# Patient Record
Sex: MALE | Race: WHITE | NOT HISPANIC OR LATINO
[De-identification: names, ages, dates, MRNs, and addresses within clinical notes are randomized per-mention and may not be internally consistent; named-entity substitution may affect disease eponyms.]

---

## 2018-02-05 PROBLEM — Z00.00 ENCOUNTER FOR PREVENTIVE HEALTH EXAMINATION: Status: ACTIVE | Noted: 2018-02-05

## 2018-02-27 ENCOUNTER — APPOINTMENT (OUTPATIENT)
Dept: CARDIOLOGY | Facility: CLINIC | Age: 48
End: 2018-02-27
Payer: COMMERCIAL

## 2018-02-27 ENCOUNTER — NON-APPOINTMENT (OUTPATIENT)
Age: 48
End: 2018-02-27

## 2018-02-27 VITALS
SYSTOLIC BLOOD PRESSURE: 135 MMHG | WEIGHT: 165 LBS | HEART RATE: 66 BPM | BODY MASS INDEX: 25.9 KG/M2 | HEIGHT: 67 IN | OXYGEN SATURATION: 98 % | DIASTOLIC BLOOD PRESSURE: 81 MMHG

## 2018-02-27 DIAGNOSIS — Z86.79 PERSONAL HISTORY OF OTHER DISEASES OF THE CIRCULATORY SYSTEM: ICD-10-CM

## 2018-02-27 DIAGNOSIS — E78.89 OTHER LIPOPROTEIN METABOLISM DISORDERS: ICD-10-CM

## 2018-02-27 DIAGNOSIS — Z78.9 OTHER SPECIFIED HEALTH STATUS: ICD-10-CM

## 2018-02-27 DIAGNOSIS — Z82.49 FAMILY HISTORY OF ISCHEMIC HEART DISEASE AND OTHER DISEASES OF THE CIRCULATORY SYSTEM: ICD-10-CM

## 2018-02-27 PROCEDURE — 93000 ELECTROCARDIOGRAM COMPLETE: CPT

## 2018-02-27 PROCEDURE — 99245 OFF/OP CONSLTJ NEW/EST HI 55: CPT

## 2018-02-27 PROCEDURE — 99401 PREV MED CNSL INDIV APPRX 15: CPT

## 2018-03-18 PROBLEM — Z86.79 HISTORY OF ASCVD: Status: RESOLVED | Noted: 2018-03-18 | Resolved: 2018-03-18

## 2018-03-18 PROBLEM — Z78.9 DOES NOT USE ILLICIT DRUGS: Status: ACTIVE | Noted: 2018-03-18

## 2018-03-18 PROBLEM — Z78.9 CURRENT NON-SMOKER: Status: ACTIVE | Noted: 2018-03-18

## 2018-03-18 PROBLEM — E78.89 ELEVATED LP(A): Status: RESOLVED | Noted: 2018-03-18 | Resolved: 2018-03-18

## 2018-03-18 PROBLEM — Z82.49 FAMILY HISTORY OF ARTERIOSCLEROTIC CARDIOVASCULAR DISEASE: Status: ACTIVE | Noted: 2018-03-18

## 2018-05-11 ENCOUNTER — APPOINTMENT (OUTPATIENT)
Dept: CV DIAGNOSTICS | Facility: HOSPITAL | Age: 48
End: 2018-05-11

## 2018-05-11 ENCOUNTER — RESULT CHARGE (OUTPATIENT)
Age: 48
End: 2018-05-11

## 2018-05-11 ENCOUNTER — OUTPATIENT (OUTPATIENT)
Dept: OUTPATIENT SERVICES | Facility: HOSPITAL | Age: 48
LOS: 1 days | End: 2018-05-11
Payer: COMMERCIAL

## 2018-05-11 DIAGNOSIS — E78.5 HYPERLIPIDEMIA, UNSPECIFIED: ICD-10-CM

## 2018-05-11 PROCEDURE — 93018 CV STRESS TEST I&R ONLY: CPT

## 2018-05-11 PROCEDURE — 93016 CV STRESS TEST SUPVJ ONLY: CPT

## 2018-05-12 ENCOUNTER — INPATIENT (INPATIENT)
Facility: HOSPITAL | Age: 48
LOS: 0 days | Discharge: HOME | DRG: 247 | End: 2018-05-13
Attending: HOSPITALIST | Admitting: HOSPITALIST
Payer: COMMERCIAL

## 2018-05-12 VITALS
OXYGEN SATURATION: 100 % | HEIGHT: 67 IN | RESPIRATION RATE: 20 BRPM | TEMPERATURE: 98 F | HEART RATE: 65 BPM | DIASTOLIC BLOOD PRESSURE: 80 MMHG | WEIGHT: 160.06 LBS | SYSTOLIC BLOOD PRESSURE: 158 MMHG

## 2018-05-12 DIAGNOSIS — I25.10 ATHEROSCLEROTIC HEART DISEASE OF NATIVE CORONARY ARTERY WITHOUT ANGINA PECTORIS: ICD-10-CM

## 2018-05-12 DIAGNOSIS — E78.89 OTHER LIPOPROTEIN METABOLISM DISORDERS: ICD-10-CM

## 2018-05-12 DIAGNOSIS — R07.89 OTHER CHEST PAIN: ICD-10-CM

## 2018-05-12 DIAGNOSIS — M10.9 GOUT, UNSPECIFIED: ICD-10-CM

## 2018-05-12 DIAGNOSIS — Z29.9 ENCOUNTER FOR PROPHYLACTIC MEASURES, UNSPECIFIED: ICD-10-CM

## 2018-05-12 LAB
ALBUMIN SERPL ELPH-MCNC: 4.5 G/DL — SIGNIFICANT CHANGE UP (ref 3.3–5)
ALP SERPL-CCNC: 65 U/L — SIGNIFICANT CHANGE UP (ref 40–120)
ALT FLD-CCNC: 20 U/L — SIGNIFICANT CHANGE UP (ref 10–45)
ANION GAP SERPL CALC-SCNC: 10 MMOL/L — SIGNIFICANT CHANGE UP (ref 5–17)
AST SERPL-CCNC: 17 U/L — SIGNIFICANT CHANGE UP (ref 10–40)
BILIRUB SERPL-MCNC: 0.4 MG/DL — SIGNIFICANT CHANGE UP (ref 0.2–1.2)
BUN SERPL-MCNC: 20 MG/DL — SIGNIFICANT CHANGE UP (ref 7–23)
CALCIUM SERPL-MCNC: 9.5 MG/DL — SIGNIFICANT CHANGE UP (ref 8.4–10.5)
CHLORIDE SERPL-SCNC: 104 MMOL/L — SIGNIFICANT CHANGE UP (ref 96–108)
CO2 SERPL-SCNC: 27 MMOL/L — SIGNIFICANT CHANGE UP (ref 22–31)
CREAT SERPL-MCNC: 0.95 MG/DL — SIGNIFICANT CHANGE UP (ref 0.5–1.3)
GLUCOSE SERPL-MCNC: 102 MG/DL — HIGH (ref 70–99)
HCT VFR BLD CALC: 44.4 % — SIGNIFICANT CHANGE UP (ref 39–50)
HGB BLD-MCNC: 15 G/DL — SIGNIFICANT CHANGE UP (ref 13–17)
MCHC RBC-ENTMCNC: 31.9 PG — SIGNIFICANT CHANGE UP (ref 27–34)
MCHC RBC-ENTMCNC: 33.8 GM/DL — SIGNIFICANT CHANGE UP (ref 32–36)
MCV RBC AUTO: 94.5 FL — SIGNIFICANT CHANGE UP (ref 80–100)
PLATELET # BLD AUTO: 231 K/UL — SIGNIFICANT CHANGE UP (ref 150–400)
POTASSIUM SERPL-MCNC: 4.5 MMOL/L — SIGNIFICANT CHANGE UP (ref 3.5–5.3)
POTASSIUM SERPL-SCNC: 4.5 MMOL/L — SIGNIFICANT CHANGE UP (ref 3.5–5.3)
PROT SERPL-MCNC: 7.4 G/DL — SIGNIFICANT CHANGE UP (ref 6–8.3)
RBC # BLD: 4.7 M/UL — SIGNIFICANT CHANGE UP (ref 4.2–5.8)
RBC # FLD: 11.9 % — SIGNIFICANT CHANGE UP (ref 10.3–14.5)
SODIUM SERPL-SCNC: 141 MMOL/L — SIGNIFICANT CHANGE UP (ref 135–145)
WBC # BLD: 7 K/UL — SIGNIFICANT CHANGE UP (ref 3.8–10.5)
WBC # FLD AUTO: 7 K/UL — SIGNIFICANT CHANGE UP (ref 3.8–10.5)

## 2018-05-12 PROCEDURE — 93010 ELECTROCARDIOGRAM REPORT: CPT

## 2018-05-12 PROCEDURE — 99255 IP/OBS CONSLTJ NEW/EST HI 80: CPT

## 2018-05-12 PROCEDURE — 93017 CV STRESS TEST TRACING ONLY: CPT

## 2018-05-12 RX ORDER — LORATADINE 10 MG/1
10 TABLET ORAL DAILY
Qty: 0 | Refills: 0 | Status: DISCONTINUED | OUTPATIENT
Start: 2018-05-12 | End: 2018-05-13

## 2018-05-12 RX ORDER — CLOPIDOGREL BISULFATE 75 MG/1
75 TABLET, FILM COATED ORAL DAILY
Qty: 0 | Refills: 0 | Status: DISCONTINUED | OUTPATIENT
Start: 2018-05-13 | End: 2018-05-13

## 2018-05-12 RX ORDER — ACETAMINOPHEN 500 MG
325 TABLET ORAL EVERY 6 HOURS
Qty: 0 | Refills: 0 | Status: DISCONTINUED | OUTPATIENT
Start: 2018-05-12 | End: 2018-05-12

## 2018-05-12 RX ORDER — ROSUVASTATIN CALCIUM 5 MG/1
40 TABLET ORAL AT BEDTIME
Qty: 0 | Refills: 0 | Status: DISCONTINUED | OUTPATIENT
Start: 2018-05-12 | End: 2018-05-13

## 2018-05-12 RX ORDER — ALLOPURINOL 300 MG
1 TABLET ORAL
Qty: 0 | Refills: 0 | COMMUNITY

## 2018-05-12 RX ORDER — ATORVASTATIN CALCIUM 80 MG/1
40 TABLET, FILM COATED ORAL AT BEDTIME
Qty: 0 | Refills: 0 | Status: DISCONTINUED | OUTPATIENT
Start: 2018-05-12 | End: 2018-05-12

## 2018-05-12 RX ORDER — ACETAMINOPHEN 500 MG
650 TABLET ORAL EVERY 6 HOURS
Qty: 0 | Refills: 0 | Status: DISCONTINUED | OUTPATIENT
Start: 2018-05-12 | End: 2018-05-13

## 2018-05-12 RX ORDER — ALLOPURINOL 300 MG
300 TABLET ORAL DAILY
Qty: 0 | Refills: 0 | Status: DISCONTINUED | OUTPATIENT
Start: 2018-05-12 | End: 2018-05-13

## 2018-05-12 RX ORDER — ENOXAPARIN SODIUM 100 MG/ML
40 INJECTION SUBCUTANEOUS EVERY 24 HOURS
Qty: 0 | Refills: 0 | Status: DISCONTINUED | OUTPATIENT
Start: 2018-05-13 | End: 2018-05-13

## 2018-05-12 RX ORDER — CETIRIZINE HYDROCHLORIDE 10 MG/1
1 TABLET ORAL
Qty: 0 | Refills: 0 | COMMUNITY

## 2018-05-12 RX ORDER — ASPIRIN/CALCIUM CARB/MAGNESIUM 324 MG
81 TABLET ORAL DAILY
Qty: 0 | Refills: 0 | Status: DISCONTINUED | OUTPATIENT
Start: 2018-05-12 | End: 2018-05-13

## 2018-05-12 RX ADMIN — ROSUVASTATIN CALCIUM 40 MILLIGRAM(S): 5 TABLET ORAL at 22:12

## 2018-05-12 RX ADMIN — Medication 300 MILLIGRAM(S): at 21:48

## 2018-05-12 RX ADMIN — LORATADINE 10 MILLIGRAM(S): 10 TABLET ORAL at 21:48

## 2018-05-12 NOTE — H&P ADULT - NSHPLABSRESULTS_GEN_ALL_CORE
The Labs were reviewed by me   The Radiology was reviewed by me    EKG tracing reviewed by me    05-12    141  |  104  |  20  ----------------------------<  102<H>  4.5   |  27  |  0.95    Ca    9.5      12 May 2018 09:24    TPro  7.4  /  Alb  4.5  /  TBili  0.4  /  DBili  x   /  AST  17  /  ALT  20  /  AlkPhos  65  05-1                                        15.0   7.0   )-----------( 231      ( 12 May 2018 09:24 )             44.4       < from: Cardiac Treadmill Stress Test (Non Imaging) (05.11.18 @ 16:11) >    ECG ABNORMALITIES DURING/AFTER STRESS:   ST Changes:ST Depression: 2 mm upsloping in leads V3, V4,  V5, V6 started at 06:00 min of exercise at HR of 125 and  persisted 07:00 min into recovery.    < end of copied text >    < from: Cardiac Cath Lab - Adult (05.12.18 @ 09:41) >     A successful drug-eluting stent was performed on the  90 % lesion in the distal circumflex.    < end of copied text >    EKG: Normal sinus rhythm

## 2018-05-12 NOTE — H&P ADULT - FAMILY HISTORY
Father  Still living? Unknown  Family history of hyperlipidemia, Age at diagnosis: Age Unknown     Sibling  Still living? Unknown  Family history of hyperlipidemia, Age at diagnosis: Age Unknown

## 2018-05-12 NOTE — H&P ADULT - PROBLEM SELECTOR PLAN 1
- Pt had an abnormal stress test as outpatient  - Cardiac cath showed 90% stenosis of the left circumflex distal artery, s/p RAMON placement   - Pt completed ASA and plavix load   - c/w ASA 81mg and Plavix 75mg q daily   - c/w High intensity statin, Atorvastatin 80mg once daily   - f/u Cardiology recs  - monitor on telemetry   - EKG in the AM - Pt had an abnormal stress test as outpatient  - Cardiac cath showed 90% stenosis of the left circumflex distal artery, s/p RAMON placement   - Pt completed ASA and plavix load   - c/w ASA 81mg and Plavix 75mg q daily   - c/w High intensity statin, Rosuvastatin 40mg once daily   - f/u Cardiology recs  - monitor on telemetry   - EKG in the AM

## 2018-05-12 NOTE — H&P ADULT - ASSESSMENT
47 y.o. male with HLD, hyperlipoproteinemia A, gout admitted for elective cardiac catheterization with RAMON placed in the distal left circumflex artery

## 2018-05-12 NOTE — H&P ADULT - NSHPPHYSICALEXAM_GEN_ALL_CORE
Vital Signs Last 24 Hrs  T(C): 36.4 (12 May 2018 09:21), Max: 36.4 (12 May 2018 09:21)  T(F): 97.5 (12 May 2018 09:21), Max: 97.5 (12 May 2018 09:21)  HR: 65 (12 May 2018 09:21) (65 - 65)  BP: 158/80 (12 May 2018 09:21) (158/80 - 158/80)  BP(mean): 106 (12 May 2018 09:21) (106 - 106)  RR: 20 (12 May 2018 09:21) (20 - 20)  SpO2: 100% (12 May 2018 09:21) (100% - 100%)    PHYSICAL EXAM:  GENERAL: NAD, well-groomed, well-developed  EYES: EOMI, PERRLA, conjunctiva and sclera clear  ENMT: No tonsillar erythema, exudates, or enlargement; Moist mucous membranes  NECK: Supple, No JVD, Normal thyroid  CHEST/LUNG: Clear to percussion bilaterally; No rales, rhonchi, wheezing, or rubs  HEART: Regular rate and rhythm; No murmurs, rubs, or gallops  ABDOMEN: Soft, Nontender, Nondistended; Bowel sounds present  VASCULAR:  2+ Peripheral Pulses, No clubbing, cyanosis, or edema  SKIN: No rashes or lesions  NERVOUS SYSTEM:  Alert & Oriented X3, Good concentration Vital Signs Last 24 Hrs  T(C): 36.4 (12 May 2018 09:21), Max: 36.4 (12 May 2018 09:21)  T(F): 97.5 (12 May 2018 09:21), Max: 97.5 (12 May 2018 09:21)  HR: 65 (12 May 2018 09:21) (65 - 65)  BP: 158/80 (12 May 2018 09:21) (158/80 - 158/80)  BP(mean): 106 (12 May 2018 09:21) (106 - 106)  RR: 20 (12 May 2018 09:21) (20 - 20)  SpO2: 100% (12 May 2018 09:21) (100% - 100%)    PHYSICAL EXAM:  GENERAL: NAD, well-groomed, well-developed  EYES: EOMI, PERRLA, conjunctiva and sclera clear  ENMT: No tonsillar erythema, exudates, or enlargement; Moist mucous membranes  NECK: Supple, No JVD, Normal thyroid  CHEST/LUNG: Clear to percussion bilaterally; No rales, rhonchi, wheezing, or rubs  HEART: Regular rate and rhythm; No murmurs, rubs, or gallops  ABDOMEN: Soft, Nontender, Nondistended; Bowel sounds present  VASCULAR:  2+ Peripheral Pulses, No clubbing, cyanosis, or edema. R femoral site without bruit or bruise. R radial site clean.  SKIN: No rashes or lesions  NERVOUS SYSTEM:  Alert & Oriented X3, Good concentration

## 2018-05-12 NOTE — CHART NOTE - NSCHARTNOTEFT_GEN_A_CORE
47M with HLD with hyperlipoproteinemia A, gout and allergies admitted after an abnormal stress test for an elective catheterization.  Patient is a frequent jogger who was asymptomatic until recently, whereupon he developed mild discomfort.  He went for an exercise stress test 5/11/18, which was positive for ST Depression: 2 mm upsloping in leads V3, V4, V5, V6 started at 06:00 min of exercise at HR of 125 and persisted 07:00 min into recovery.  As such, patient underwent LHC today (5/12) with RAMON to LCx (90%).  Patient currently asymptomatic without complaints and is eager to go home tomorrow.  Endorses mild pain/discomfort when RFA site palpated.  Area is C/D/I as is the RRA that was the initial site of cath attempt.    Cardiology, Dr Noel, evaluated patient, and recommended patient be monitored on Tele overnight and maybe d/nicole home tomorrow.    Plan:  - monitor on Tele  - c/w ASA/Plavix with OMT  - f/u Cards recs and likely d/c home tmw    MD Zaheer PGY-3  Internal Medicine Team 4  [phone]

## 2018-05-12 NOTE — H&P ADULT - ATTENDING COMMENTS
Agree.  Patietn well appearing s/p RAMON on DAPT; being treated outpatient for genetic hyperlipidemia.   Femoral site and radial site clean and well appearing, d/c home tmrw likely.   D/w Dr. Noel.

## 2018-05-12 NOTE — H&P ADULT - NSHPREVIEWOFSYSTEMS_GEN_ALL_CORE
REVIEW OF SYSTEMS:  CONSTITUTIONAL: No fever, chills, night sweats, or fatigue  EYES: No eye pain, visual disturbances, or discharge  ENMT:  No difficulty hearing, tinnitus, vertigo; No sinus or throat pain  NECK: No pain or stiffness  BREASTS: No pain, masses, or nipple discharge  RESPIRATORY: No cough, wheezing, or hemoptysis; No shortness of breath  CARDIOVASCULAR: No chest pain, palpitations, dizziness, or leg swelling  GASTROINTESTINAL: No abdominal or epigastric pain. No nausea, vomiting, or hematemesis; No diarrhea or constipation. No melena or hematochezia.  GENITOURINARY: No dysuria, frequency, hematuria, or incontinence  NEUROLOGICAL: No headaches, memory loss, loss of strength, numbness, or tremors  SKIN: No itching, burning, rashes, or lesions   LYMPH NODES: No enlarged glands  ENDOCRINE: No heat or cold intolerance; No hair loss  MUSCULOSKELETAL: No joint pain or swelling; No muscle, back, or extremity pain  PSYCHIATRIC: No depression, anxiety, mood swings, or difficulty sleeping  HEME/LYMPH: No easy bruising, or bleeding gums  ALLERY AND IMMUNOLOGIC: No hives or eczema

## 2018-05-12 NOTE — CONSULT NOTE ADULT - SUBJECTIVE AND OBJECTIVE BOX
**********              CARDIOLOGY CONSULT NOTE              ************  ============================================================  CHIEF COMPLAINT/REASON FOR CONSULT:  Patient is a 47y old  Male who presents with a chief complaint of Elective cardiac cath (12 May 2018 12:05)      HISTORY OF PRESENT ILLNESS:  47yMale with a history of HTN, HL, Sig FHx, ASCVD - p/w CP abnormal stress found to have tight LCx now s/p PCI.  CP /10 occurring while running. Occurred on resting. Progressive. No asssoc SOB/Palps.   Radiates down clavicle.   Reviewed outside medical records. Obtained history from relevant family members.    ============================================================    ============================================================        Allergies    bees (Hives)  Cipro (Hives)    Intolerances    	    MEDICATIONS:  aspirin enteric coated 81 milliGRAM(s) Oral daily      loratadine 10 milliGRAM(s) Oral daily    acetaminophen   Tablet 650 milliGRAM(s) Oral every 6 hours PRN  acetaminophen   Tablet. 325 milliGRAM(s) Oral every 6 hours PRN      allopurinol 300 milliGRAM(s) Oral daily  atorvastatin 40 milliGRAM(s) Oral at bedtime        PAST MEDICAL & SURGICAL HISTORY:  ASCVD (arteriosclerotic cardiovascular disease)  Elevated lipoprotein(a)  HLD (hyperlipidemia)  No significant past surgical history      FAMILY HISTORY:  Family history of hyperlipidemia (Father, Sibling)    NC - Mother/Father    SOCIAL HISTORY:    [ x] Non-smoker  [x] No Illicit Drug Use  [ x] No Excess EtOH Use      REVIEW OF SYSTEMS: (Unless + Before Symptom, it is negative)  Constitutional: No Fever, Fatigue, Weight Changes  Eyes: No Recent Vision Changes, Eye Pain  ENT: No Congestion, Sore Throat  Endocrine: No Excess Sweating, Temperature Intolerance  Cardiovascular: +Chest Pain, Palpitations, Shortness of Breath, Pre-syncope, Syncope, LE Edema  Respiratory: No Cough, Congestion, Wheezing  Gastrointestinal: No Abdominal Pain, Nausea, Vomiting  Genitourinary: No dysuria, hematuria  Musculoskeletal: No Joint Pain, Swelling  Neurologic: No headaches, Imbalance, Weakness  Skin: No rashes, hematoma, purprura    ================================    PHYSICAL EXAM:  T(C): 36.4 (18 @ 09:21), Max: 36.4 (18 @ 09:21)  HR: 65 (18 @ 09:21) (65 - 65)  BP: 158/80 (18 @ 09:21) (158/80 - 158/80)  RR: 20 (18 @ 09:21) (20 - 20)  SpO2: 100% (18 @ 09:21) (100% - 100%)  Wt(kg): --  I&O's Summary    Appearance: Normal; NAD	  HEENT:   Normal oral mucosa, EOMI	  Lymphatic: No lymphadenopathy  Cardiovascular: Normal S1 S2, No JVD, No murmurs, No edema  Respiratory: Lungs clear to auscultation, no use of accessory muscles	  Psychiatry: A & O x 3, Mood & affect appropriate  Gastrointestinal:  Soft, Non-tender	  Skin: No rashes, No ecchymoses, No cyanosis	  Neurologic: Non-focal, No Focal Deficits  Extremities: Normal range of motion, No clubbing, cyanosis or edema  Vascular: Peripheral pulses palpable 2+ bilaterally, no prominent varicosities    ============================    LABS:	   Labs [id number] @ 12:33	    CBC Full  -  ( 12 May 2018 09:24 )  WBC Count : 7.0 K/uL  Hemoglobin : 15.0 g/dL  Hematocrit : 44.4 %  Platelet Count - Automated : 231 K/uL  Mean Cell Volume : 94.5 fl  Mean Cell Hemoglobin : 31.9 pg  Mean Cell Hemoglobin Concentration : 33.8 gm/dL  Auto Neutrophil # : x  Auto Lymphocyte # : x  Auto Monocyte # : x  Auto Eosinophil # : x  Auto Basophil # : x  Auto Neutrophil % : x  Auto Lymphocyte % : x  Auto Monocyte % : x  Auto Eosinophil % : x  Auto Basophil % : x        141  |  104  |  20  ----------------------------<  102<H>  4.5   |  27  |  0.95    Ca    9.5      12 May 2018 09:24    TPro  7.4  /  Alb  4.5  /  TBili  0.4  /  DBili  x   /  AST  17  /  ALT  20  /  AlkPhos  65          =========================================================================  ECG:  	  NSR no ischemic changes    PREVIOUS DIAGNOSTIC TESTING:    [X] Echocardiogram:      [X]  Catheterization:  < from: Cardiac Cath Lab - Adult (18 @ 09:41) >  Case Physician(s):  LATIA Gonzalez M.D.  Referring Physician:  Leroy Wick M.D.  INDICATIONS: Stable angina - CCS2. Abnormal stress test.  HISTORY: There was no prior cardiac history. The patient has hypertension  and medication-treated dyslipidemia.  PROCEDURE:  --  Left coronary angiography.  --  Right coronary angiography.  --  Hemostasis with Angioseal.  --  Sheath Exchange for Intervention.  --  Intervention on distal circumflex: drug-eluting stent.  TECHNIQUE: The risks and alternatives of the procedures and conscious  sedation were explained to the patient and informed consent was obtained.  Cardiac catheterization performed electively. Coronary intervention  performed electively.  Local anesthetic given. Right radial artery access. Right femoral artery  access. Left coronary artery angiography. The vessel was injected  utilizing a catheter. Right coronary artery angiography. The vessel was  injected utilizing acatheter. Hemostasis with Angioseal. RADIATION  EXPOSURE: 10.7 min. A successful drug-eluting stent was performed on the  90 % lesion in the distal circumflex. Following intervention there was a 1  % residual stenosis. According to the ACC/AHA classification system, this  lesion was a type C lesion. There was SHELL 3 flow before the procedure and  SHELL 3 flow after the procedure. Vessel setup was performed. A 6FR JL4  LAUNCHER guiding catheter was used to intubate the vessel. Vessel setup  was performed. A [employer] UNIVERSAL 190CM wire was used to cross the lesion.  Balloon angioplasty was performed, using a 2.0 X 10 EUPHORA balloon, with  1 inflations and a maximum inflation pressure of 14 dorita. A 2.25 X 12 DIAMOND  drug-eluting stent was placed across the lesion and deployed at a maximum  inflation pressure of 12 dorita. Sheath Exchange for Intervention.  CONTRAST GIVEN: Omnipaque 120 ml.  MEDICATIONS GIVEN: Midazolam, 1 mg, IV. Fentanyl, 25 mcg, IV. Fentanyl, 50  mcg, IV. Fentanyl, 25 mcg, IV. Verapamil (Isoptin, Calan, Covera), 2.5 mg,  IA. Heparin, 3000 units, IA. Heparin, 4000 units, IV. Aspirin, 325 mg, PO.  Clopidogrel (Plavix), 600 mg, PO.  CORONARY VESSELS: The coronary circulation is right dominant.  LM:   --  LM: Angiography showed minor luminal irregularities with no flow  limiting lesions.  LAD:   --  LAD: Angiography showed minor luminal irregularities with no  flow limiting lesions.  CX:   --  Distal circumflex: There was a 90 % stenosis.  RCA:   --  RCA: Angiography showed minor luminal irregularities with no  flow limiting lesions.  COMPLICATIONS: There were no complications.  DIAGNOSTIC RECOMMENDATIONS: ASA and Plavix for 1 year.  INTERVENTIONAL RECOMMENDATIONS: ASA and Plavix for 1 year.  Prepared and signed by  Charly Casanova M.D.  Signed 2018 11:34:25  HEMODYNAMIC TABLES  Pressures:  Baseline  Pressures:  - HR: 75  Pressures:  - Rhythm:  Pressures:  -- Aortic Pressure (S/D/M): 143/81/108  Pressures:  Intervention  Pressures:  - HR: 73  Pressures:  - Rhythm:  Pressures:  -- Aortic Pressure (S/D/M): 145/83/105  Outputs:  Baseline  Outputs:  -- CALCULATIONS[age]  Outputs:  -- CALCULATIONS: Body Surface Area: 1.84  Outputs:  -- CALCULATIONS: Height in cm: 170.00  Outputs:  -- CALCULATIONS: Sex: Male  Outputs:  -- CALCULATIONS: Weight in k.60    < end of copied text >        =========================================================================  ASSESSMENT:  Hyperlipoproteinemia A  ASCVD  S/P PCI  Borderline DM    Recommendations  - Crestor 40  - Ezetimibe 20 as outpatient  - ASA/Plavix x 1 yr  - F/U with me 2 weeks  - Likely discuss Metformin going forward  - Monitor overnight  - Thank you for this consult.  - Will Follow      Please call with questions.     Leroy Wick MD, [facility]  [phone]        Total time spent in face-to-face encounter was 80 minutes. > 50 minutes spent in counseling and coordination of care addressing all medical issues listed above. All labs, imaging, consultant reports, and any relevant outside medical records personally reviewed in order to evaluate and manage the patient medically as well as provide coordination of care amongst providers.

## 2018-05-12 NOTE — H&P ADULT - NSHPSOCIALHISTORY_GEN_ALL_CORE
Social History:    Marital Status:  (   )    (  x ) Single    (   )    (  )   Occupation: union worker  Lives with: ( x ) alone  (  ) children   (  ) spouse   (  ) parents  (  ) other    Substance Use (street drugs): (x  ) never used  (  ) other:  Tobacco Usage:  ( x  ) never smoked   (   ) former smoker   (   ) current smoker  (     ) pack year  (        ) last cigarette date  Alcohol Usage: Social drinker

## 2018-05-12 NOTE — H&P ADULT - HISTORY OF PRESENT ILLNESS
47 y.o. male with HLD, hyperlipoproteinemia A, gout and allergies admitted after an abnormal stress test for an elective catheterization The patient states he was in his usual health until yesterday morning the patient on his morning jog began to have mild substernal pressure like chest pain. The patient contacted his cardiologist and was schedule for a exercise stress test. On the stress test the patient developed ST depressions in V3-V6 after 6 minutes of exercise. The patient was recommended to have an elective cardiac cath by Dr. Noel, which the patient agreed to. The patient underwent cardiac cath on 5/12 and had RAMON placed in the Left circumflex artery (90% stenosis). The patient at this time has no complaints. Currently denies fever, chills, night sweats, cough, nasal congestion, dyspnea, chest pain, palpations, nausea/vomiting, abd. pain, diarrhea, constipation, dysuria, urinary frequency, leg swelling, joint pain, and rashes.  The patient denies any pain at the femoral access site.

## 2018-05-12 NOTE — H&P CARDIOLOGY - HISTORY OF PRESENT ILLNESS
47 year old Gentleman - Works for [employer] Workers Hyperlipidemia /Hyperlipoprotein (a)/FHx of Elevated Lp (a)/ASCVD. Patient's family members are patients (Brother with a Stent). They have very elevated levels of Lipoprotein (a).  Patient feels well, and is regularly active as a jogger with no Palps/ SOB. Moderate plaque in Carotid Artery, c/o mild chest discomfort on jogging s/p stress test on 5/11/18 ST Depression: 2 mm upsloping in leads V3,  V4, V5, V6 started at 06:00 min of exercise at HR of 125  and persisted 07:00 min into recovery.  Arrhythmia: None.  seen & evaluated by Dr Shamika alvarez & now recommends for cardiac cath.

## 2018-05-13 ENCOUNTER — TRANSCRIPTION ENCOUNTER (OUTPATIENT)
Age: 48
End: 2018-05-13

## 2018-05-13 VITALS — WEIGHT: 158.73 LBS

## 2018-05-13 LAB
ANION GAP SERPL CALC-SCNC: 13 MMOL/L — SIGNIFICANT CHANGE UP (ref 5–17)
BUN SERPL-MCNC: 19 MG/DL — SIGNIFICANT CHANGE UP (ref 7–23)
CALCIUM SERPL-MCNC: 9.2 MG/DL — SIGNIFICANT CHANGE UP (ref 8.4–10.5)
CHLORIDE SERPL-SCNC: 101 MMOL/L — SIGNIFICANT CHANGE UP (ref 96–108)
CO2 SERPL-SCNC: 23 MMOL/L — SIGNIFICANT CHANGE UP (ref 22–31)
CREAT SERPL-MCNC: 0.96 MG/DL — SIGNIFICANT CHANGE UP (ref 0.5–1.3)
GLUCOSE SERPL-MCNC: 100 MG/DL — HIGH (ref 70–99)
HCT VFR BLD CALC: 41.6 % — SIGNIFICANT CHANGE UP (ref 39–50)
HGB BLD-MCNC: 14.6 G/DL — SIGNIFICANT CHANGE UP (ref 13–17)
MAGNESIUM SERPL-MCNC: 2 MG/DL — SIGNIFICANT CHANGE UP (ref 1.6–2.6)
MCHC RBC-ENTMCNC: 32.8 PG — SIGNIFICANT CHANGE UP (ref 27–34)
MCHC RBC-ENTMCNC: 35.2 GM/DL — SIGNIFICANT CHANGE UP (ref 32–36)
MCV RBC AUTO: 93.2 FL — SIGNIFICANT CHANGE UP (ref 80–100)
PHOSPHATE SERPL-MCNC: 3 MG/DL — SIGNIFICANT CHANGE UP (ref 2.5–4.5)
PLATELET # BLD AUTO: 221 K/UL — SIGNIFICANT CHANGE UP (ref 150–400)
POTASSIUM SERPL-MCNC: 4.1 MMOL/L — SIGNIFICANT CHANGE UP (ref 3.5–5.3)
POTASSIUM SERPL-SCNC: 4.1 MMOL/L — SIGNIFICANT CHANGE UP (ref 3.5–5.3)
RBC # BLD: 4.47 M/UL — SIGNIFICANT CHANGE UP (ref 4.2–5.8)
RBC # FLD: 11.8 % — SIGNIFICANT CHANGE UP (ref 10.3–14.5)
SODIUM SERPL-SCNC: 137 MMOL/L — SIGNIFICANT CHANGE UP (ref 135–145)
WBC # BLD: 10.8 K/UL — HIGH (ref 3.8–10.5)
WBC # FLD AUTO: 10.8 K/UL — HIGH (ref 3.8–10.5)

## 2018-05-13 PROCEDURE — C1874: CPT

## 2018-05-13 PROCEDURE — 93010 ELECTROCARDIOGRAM REPORT: CPT

## 2018-05-13 PROCEDURE — C1894: CPT

## 2018-05-13 PROCEDURE — 85027 COMPLETE CBC AUTOMATED: CPT

## 2018-05-13 PROCEDURE — C1760: CPT

## 2018-05-13 PROCEDURE — C1769: CPT

## 2018-05-13 PROCEDURE — 99152 MOD SED SAME PHYS/QHP 5/>YRS: CPT

## 2018-05-13 PROCEDURE — 83735 ASSAY OF MAGNESIUM: CPT

## 2018-05-13 PROCEDURE — 99239 HOSP IP/OBS DSCHRG MGMT >30: CPT

## 2018-05-13 PROCEDURE — 93454 CORONARY ARTERY ANGIO S&I: CPT | Mod: 59

## 2018-05-13 PROCEDURE — 99153 MOD SED SAME PHYS/QHP EA: CPT

## 2018-05-13 PROCEDURE — 93005 ELECTROCARDIOGRAM TRACING: CPT

## 2018-05-13 PROCEDURE — C9600: CPT | Mod: LC

## 2018-05-13 PROCEDURE — C1887: CPT

## 2018-05-13 PROCEDURE — 99233 SBSQ HOSP IP/OBS HIGH 50: CPT

## 2018-05-13 PROCEDURE — C1725: CPT

## 2018-05-13 PROCEDURE — 80053 COMPREHEN METABOLIC PANEL: CPT

## 2018-05-13 PROCEDURE — 84100 ASSAY OF PHOSPHORUS: CPT

## 2018-05-13 PROCEDURE — 80048 BASIC METABOLIC PNL TOTAL CA: CPT

## 2018-05-13 RX ORDER — ASPIRIN/CALCIUM CARB/MAGNESIUM 324 MG
1 TABLET ORAL
Qty: 30 | Refills: 6 | OUTPATIENT
Start: 2018-05-13 | End: 2018-12-08

## 2018-05-13 RX ORDER — ROSUVASTATIN CALCIUM 5 MG/1
1 TABLET ORAL
Qty: 30 | Refills: 6 | OUTPATIENT
Start: 2018-05-13 | End: 2018-12-08

## 2018-05-13 RX ORDER — ACETAMINOPHEN 500 MG
2 TABLET ORAL
Qty: 80 | Refills: 0 | OUTPATIENT
Start: 2018-05-13 | End: 2018-05-22

## 2018-05-13 RX ORDER — ASPIRIN/CALCIUM CARB/MAGNESIUM 324 MG
1 TABLET ORAL
Qty: 0 | Refills: 0 | COMMUNITY

## 2018-05-13 RX ORDER — CLOPIDOGREL BISULFATE 75 MG/1
1 TABLET, FILM COATED ORAL
Qty: 30 | Refills: 6 | OUTPATIENT
Start: 2018-05-13 | End: 2018-12-08

## 2018-05-13 RX ORDER — EZETIMIBE 10 MG/1
2 TABLET ORAL
Qty: 60 | Refills: 6 | OUTPATIENT
Start: 2018-05-13 | End: 2018-12-08

## 2018-05-13 RX ORDER — ROSUVASTATIN CALCIUM 5 MG/1
1 TABLET ORAL
Qty: 0 | Refills: 0 | COMMUNITY

## 2018-05-13 RX ADMIN — CLOPIDOGREL BISULFATE 75 MILLIGRAM(S): 75 TABLET, FILM COATED ORAL at 09:49

## 2018-05-13 RX ADMIN — ENOXAPARIN SODIUM 40 MILLIGRAM(S): 100 INJECTION SUBCUTANEOUS at 05:04

## 2018-05-13 NOTE — PROGRESS NOTE ADULT - ASSESSMENT
47 y.o. male with HLD, hyperlipoproteinemia A, gout admitted for elective cardiac catheterization with ARMON placed in the distal left circumflex artery.

## 2018-05-13 NOTE — DISCHARGE NOTE ADULT - CARE PROVIDER_API CALL
Leroy Wick), Cardiovascular Disease; Internal Medicine  [address]  [address] [place] [address]  Phone: [phone]  Fax: [phone]

## 2018-05-13 NOTE — DISCHARGE NOTE ADULT - PLAN OF CARE
Management Please continue taking your medications as prescribed and follow up with your Cardiologist, Dr Wick, within two weeks of discharge.  If you experience any redness, discharge, fever, or signs of infection at your right wrist or right groin, please call your Cardiologist immediately or come back to the [place] ED.  Please also return to the ED if you experience any chest pain or shortness of breath.

## 2018-05-13 NOTE — DISCHARGE NOTE ADULT - INSTRUCTIONS
Please eat a diet low in salt and cholesterol.  Please do not engage in any strenuous activity until cleared by your Cardiologist. follow up with your PMD as indicated

## 2018-05-13 NOTE — DISCHARGE NOTE ADULT - NSCORESITESY/N_GEN_A_CORE_RD
No

## 2018-05-13 NOTE — DISCHARGE NOTE ADULT - MEDICATION SUMMARY - MEDICATIONS TO TAKE
I will START or STAY ON the medications listed below when I get home from the hospital:    Aspirin Enteric Coated 81 mg oral delayed release tablet  -- 1 tab(s) by mouth once a day  -- Indication: For Coronary artery disease involving native coronary artery of native heart without angina pectoris    acetaminophen 325 mg oral tablet  -- 2 tab(s) by mouth every 6 hours, As needed, Mild and Moderate Pain  -- Indication: For Coronary artery disease involving native coronary artery of native heart without angina pectoris    Aspirin Enteric Coated 81 mg oral delayed release tablet  -- 1 tab(s) by mouth once a day  -- Indication: For Coronary artery disease involving native coronary artery of native heart without angina pectoris    allopurinol 300 mg oral tablet  -- 1 tab(s) by mouth once a day  -- Indication: For Gout, unspecified cause, unspecified chronicity, unspecified site    ZyrTEC 10 mg oral tablet  -- 1 tab(s) by mouth once a day  -- Indication: For Allergy    rosuvastatin 40 mg oral tablet  -- 1 tab(s) by mouth once a day (at bedtime)  -- Indication: For Coronary artery disease involving native coronary artery of native heart without angina pectoris    clopidogrel 75 mg oral tablet  -- 1 tab(s) by mouth once a day  -- Indication: For Coronary artery disease involving native coronary artery of native heart without angina pectoris I will START or STAY ON the medications listed below when I get home from the hospital:    acetaminophen 325 mg oral tablet  -- 2 tab(s) by mouth every 6 hours, As needed, Mild and Moderate Pain  -- Indication: For Coronary artery disease involving native coronary artery of native heart without angina pectoris    Aspirin Enteric Coated 81 mg oral delayed release tablet  -- 1 tab(s) by mouth once a day  -- Indication: For Coronary artery disease involving native coronary artery of native heart without angina pectoris    allopurinol 300 mg oral tablet  -- 1 tab(s) by mouth once a day  -- Indication: For Gout, unspecified cause, unspecified chronicity, unspecified site    ZyrTEC 10 mg oral tablet  -- 1 tab(s) by mouth once a day  -- Indication: For Allergy    rosuvastatin 40 mg oral tablet  -- 1 tab(s) by mouth once a day (at bedtime)  -- Indication: For Coronary artery disease involving native coronary artery of native heart without angina pectoris    ezetimibe 10 mg oral tablet  -- 2 tab(s) by mouth once a day  -- Indication: For Coronary artery disease involving native coronary artery of native heart without angina pectoris    clopidogrel 75 mg oral tablet  -- 1 tab(s) by mouth once a day  -- Indication: For Coronary artery disease involving native coronary artery of native heart without angina pectoris

## 2018-05-13 NOTE — DISCHARGE NOTE ADULT - PATIENT PORTAL LINK FT
You can access the [email or web address], offered by [facility], by registering with the following website: [email or web address]

## 2018-05-13 NOTE — PROGRESS NOTE ADULT - SUBJECTIVE AND OBJECTIVE BOX
Consult:  · Requested by Name:	Charly Casanova	  · Date/Time:	13-May-2018	  · Reason for Referral/Consultation:	CP/Abnormal Stress/S/P LHC w PCI	      · Subjective and Objective: 	  **********              CARDIOLOGY CONSULT PROGRESS NOTE              ************  ============================================================  CHIEF COMPLAINT/REASON FOR CONSULT:  Patient is a 47y old  Male who presents with a chief complaint of Elective cardiac cath (12 May 2018 12:05)      HISTORY OF PRESENT ILLNESS:  47yMale with a history of HTN, HL, Sig FHx, ASCVD - p/w CP abnormal stress found to have tight LCx now s/p PCI.  CP 4/10 occurring while running. Occurred on resting. Progressive. No asssoc SOB/Palps.   Radiates down clavicle.   Reviewed outside medical records. Obtained history from relevant family members.    ============================================================  24 Hr Events  - Groinwithout issues  - no CP/SOB/Palps    ============================================================        Allergies    bees (Hives)  Cipro (Hives)    Intolerances    	    MEDICATIONS:  aspirin enteric coated 81 milliGRAM(s) Oral daily      loratadine 10 milliGRAM(s) Oral daily    acetaminophen   Tablet 650 milliGRAM(s) Oral every 6 hours PRN  acetaminophen   Tablet. 325 milliGRAM(s) Oral every 6 hours PRN      allopurinol 300 milliGRAM(s) Oral daily  atorvastatin 40 milliGRAM(s) Oral at bedtime        PAST MEDICAL & SURGICAL HISTORY:  ASCVD (arteriosclerotic cardiovascular disease)  Elevated lipoprotein(a)  HLD (hyperlipidemia)  No significant past surgical history      FAMILY HISTORY:  Family history of hyperlipidemia (Father, Sibling)    NC - Mother/Father    SOCIAL HISTORY:    [ x] Non-smoker  [x] No Illicit Drug Use  [ x] No Excess EtOH Use      REVIEW OF SYSTEMS: (Unless + Before Symptom, it is negative)  Constitutional: No Fever, Fatigue, Weight Changes  Eyes: No Recent Vision Changes, Eye Pain  ENT: No Congestion, Sore Throat  Endocrine: No Excess Sweating, Temperature Intolerance  Cardiovascular: +Chest Pain, Palpitations, Shortness of Breath, Pre-syncope, Syncope, LE Edema  Respiratory: No Cough, Congestion, Wheezing  Gastrointestinal: No Abdominal Pain, Nausea, Vomiting  Genitourinary: No dysuria, hematuria  Musculoskeletal: No Joint Pain, Swelling  Neurologic: No headaches, Imbalance, Weakness  Skin: No rashes, hematoma, purprura    ================================    PHYSICAL EXAM:  T(C): 36.4 (18 @ 09:21), Max: 36.4 (18 @ 09:21)  HR: 65 (18 @ 09:21) (65 - 65)  BP: 158/80 (18 @ 09:21) (158/80 - 158/80)  RR: 20 (18 @ 09:21) (20 - 20)  SpO2: 100% (18 @ 09:21) (100% - 100%)  Wt(kg): --  I&O's Summary    Appearance: Normal; NAD	  HEENT:   Normal oral mucosa, EOMI	  Lymphatic: No lymphadenopathy  Cardiovascular: Normal S1 S2, No JVD, No murmurs, No edema  Respiratory: Lungs clear to auscultation, no use of accessory muscles	  Psychiatry: A & O x 3, Mood & affect appropriate  Gastrointestinal:  Soft, Non-tender	  Skin: No rashes, No ecchymoses, No cyanosis	  Neurologic: Non-focal, No Focal Deficits  Extremities: Normal range of motion, No clubbing, cyanosis or edema  Vascular: Peripheral pulses palpable 2+ bilaterally, no prominent varicosities    ============================    LABS:	   Labs [id number]    CBC Full  -  ( 12 May 2018 09:24 )  WBC Count : 7.0 K/uL  Hemoglobin : 15.0 g/dL  Hematocrit : 44.4 %  Platelet Count - Automated : 231 K/uL  Mean Cell Volume : 94.5 fl  Mean Cell Hemoglobin : 31.9 pg  Mean Cell Hemoglobin Concentration : 33.8 gm/dL  Auto Neutrophil # : x  Auto Lymphocyte # : x  Auto Monocyte # : x  Auto Eosinophil # : x  Auto Basophil # : x  Auto Neutrophil % : x  Auto Lymphocyte % : x  Auto Monocyte % : x  Auto Eosinophil % : x  Auto Basophil % : x        141  |  104  |  20  ----------------------------<  102<H>  4.5   |  27  |  0.95    Ca    9.5      12 May 2018 09:24    TPro  7.4  /  Alb  4.5  /  TBili  0.4  /  DBili  x   /  AST  17  /  ALT  20  /  AlkPhos  65          =========================================================================  ECG:  	  NSR no ischemic changes    PREVIOUS DIAGNOSTIC TESTING:    [X] Echocardiogram:      [X]  Catheterization:  < from: Cardiac Cath Lab - Adult (18 @ 09:41) >  Case Physician(s):  LATIA Gonzalez M.D.  Referring Physician:  Leroy Wick M.D.  INDICATIONS: Stable angina - CCS2. Abnormal stress test.  HISTORY: There was no prior cardiac history. The patient has hypertension  and medication-treated dyslipidemia.  PROCEDURE:  --  Left coronary angiography.  --  Right coronary angiography.  --  Hemostasis with Angioseal.  --  Sheath Exchange for Intervention.  --  Intervention on distal circumflex: drug-eluting stent.  TECHNIQUE: The risks and alternatives of the procedures and conscious  sedation were explained to the patient and informed consent was obtained.  Cardiac catheterization performed electively. Coronary intervention  performed electively.  Local anesthetic given. Right radial artery access. Right femoral artery  access. Left coronary artery angiography. The vessel was injected  utilizing a catheter. Right coronary artery angiography. The vessel was  injected utilizing acatheter. Hemostasis with Angioseal. RADIATION  EXPOSURE: 10.7 min. A successful drug-eluting stent was performed on the  90 % lesion in the distal circumflex. Following intervention there was a 1  % residual stenosis. According to the ACC/AHA classification system, this  lesion was a type C lesion. There was SHELL 3 flow before the procedure and  SHELL 3 flow after the procedure. Vessel setup was performed. A 6FR JL4  LAUNCHER guiding catheter was used to intubate the vessel. Vessel setup  was performed. A [employer] UNIVERSAL 190CM wire was used to cross the lesion.  Balloon angioplasty was performed, using a 2.0 X 10 EUPHORA balloon, with  1 inflations and a maximum inflation pressure of 14 dorita. A 2.25 X 12 DIAMOND  drug-eluting stent was placed across the lesion and deployed at a maximum  inflation pressure of 12 dorita. Sheath Exchange for Intervention.  CONTRAST GIVEN: Omnipaque 120 ml.  MEDICATIONS GIVEN: Midazolam, 1 mg, IV. Fentanyl, 25 mcg, IV. Fentanyl, 50  mcg, IV. Fentanyl, 25 mcg, IV. Verapamil (Isoptin, Calan, Covera), 2.5 mg,  IA. Heparin, 3000 units, IA. Heparin, 4000 units, IV. Aspirin, 325 mg, PO.  Clopidogrel (Plavix), 600 mg, PO.  CORONARY VESSELS: The coronary circulation is right dominant.  LM:   --  LM: Angiography showed minor luminal irregularities with no flow  limiting lesions.  LAD:   --  LAD: Angiography showed minor luminal irregularities with no  flow limiting lesions.  CX:   --  Distal circumflex: There was a 90 % stenosis.  RCA:   --  RCA: Angiography showed minor luminal irregularities with no  flow limiting lesions.  COMPLICATIONS: There were no complications.  DIAGNOSTIC RECOMMENDATIONS: ASA and Plavix for 1 year.  INTERVENTIONAL RECOMMENDATIONS: ASA and Plavix for 1 year.  Prepared and signed by  Charly Casanvoa M.D.  Signed 2018 11:34:25  HEMODYNAMIC TABLES  Pressures:  Baseline  Pressures:  - HR: 75  Pressures:  - Rhythm:  Pressures:  -- Aortic Pressure (S/D/M): 143/81/108  Pressures:  Intervention  Pressures:  - HR: 73  Pressures:  - Rhythm:  Pressures:  -- Aortic Pressure (S/D/M): 145/83/105  Outputs:  Baseline  Outputs:  -- CALCULATIONS[age]  Outputs:  -- CALCULATIONS: Body Surface Area: 1.84  Outputs:  -- CALCULATIONS: Height in cm: 170.00  Outputs:  -- CALCULATIONS: Sex: Male  Outputs:  -- CALCULATIONS: Weight in k.60    < end of copied text >        =========================================================================  ASSESSMENT:  Hyperlipoproteinemia A  ASCVD  S/P PCI  Borderline DM    Recommendations  - Crestor 40  - Ezetimibe 20 as outpatient  - ASA/Plavix x 1 yr  - F/U with me 2 weeks  - Likely discuss Metformin going forward  - Can D/C Home      Please call with questions.     Leroy Wick MD, [facility]  [phone]        Total time spent in face-to-face encounter was 45 minutes. > 50 minutes spent in counseling and coordination of care addressing all medical issues listed above. All labs, imaging, consultant reports, and any relevant outside medical records personally reviewed in order to evaluate and manage the patient medically as well as provide coordination of care amongst providers.
Andrews Grubbs MD, Internal Medicine, PGY1  Pager - [facility]: [phone] ; [facility]: [phone]    Patient is a 47y old  Male who presents with a chief complaint of Elective cardiac cath (12 May 2018 12:05)        SUBJECTIVE / OVERNIGHT EVENTS: NAEON. No events on tele. Patient feels well. Denies CP, SOB, palpitations, N/V, abdominal pain.      MEDICATIONS  (STANDING):  allopurinol 300 milliGRAM(s) Oral daily  aspirin enteric coated 81 milliGRAM(s) Oral daily  clopidogrel Tablet 75 milliGRAM(s) Oral daily  enoxaparin Injectable 40 milliGRAM(s) SubCutaneous every 24 hours  loratadine 10 milliGRAM(s) Oral daily  rosuvastatin 40 milliGRAM(s) Oral at bedtime    MEDICATIONS  (PRN):  acetaminophen   Tablet 650 milliGRAM(s) Oral every 6 hours PRN For Temp greater than 38 C (100.4 F)  acetaminophen   Tablet. 650 milliGRAM(s) Oral every 6 hours PRN Mild and Moderate Pain      T(C): 36.8 (05-13-18 @ 04:32), Max: 36.8 (05-12-18 @ 14:21)  T(F): 98.2 (05-13-18 @ 04:32), Max: 98.2 (05-12-18 @ 14:21)  HR: 70 (05-13-18 @ 04:32) (60 - 70)  BP: 121/69 (05-13-18 @ 04:32) (121/69 - 160/94)  ABP: --  ABP(mean): --  RR: 17 (05-13-18 @ 04:32) (16 - 20)  SpO2: 99% (05-13-18 @ 04:32) (95% - 100%)    CAPILLARY BLOOD GLUCOSE        I&O's Summary    12 May 2018 07:01  -  13 May 2018 07:00  --------------------------------------------------------  IN: 420 mL / OUT: 550 mL / NET: -130 mL        PHYSICAL EXAM  GENERAL: NAD, well-developed  HEAD:  Atraumatic, Normocephalic  EYES: EOMI, PERRLA, conjunctiva and sclera clear  NECK: Supple, No JVD  CHEST/LUNG: Clear to auscultation bilaterally; No wheeze  HEART: Regular rate and rhythm; No murmurs, rubs, or gallops  ABDOMEN: Soft, Nontender, Nondistended; Bowel sounds present  EXTREMITIES:  2+ Peripheral Pulses, No clubbing, cyanosis, or edema; R groin and arm cath sites without evidence of hematoma  NEURO:  AOx3, No focal deficits, CN II-XII intact  PSYCH: Appropriate mood and affect  SKIN: No rashes or lesions    LABS:                        14.6   10.8  )-----------( 221      ( 13 May 2018 06:03 )             41.6     05-13    137  |  101  |  19  ----------------------------<  100<H>  4.1   |  23  |  0.96    Ca    9.2      13 May 2018 06:03  Phos  3.0     05-13  Mg     2.0     05-13    TPro  7.4  /  Alb  4.5  /  TBili  0.4  /  DBili  x   /  AST  17  /  ALT  20  /  AlkPhos  65  05-12              RADIOLOGY & ADDITIONAL TESTS:    Imaging Personally Reviewed:  Consultant(s) Notes Reviewed:    Care Discussed with Consultants/Other Providers:

## 2018-05-13 NOTE — DISCHARGE NOTE ADULT - CARE PLAN
Principal Discharge DX:	Coronary artery disease involving native coronary artery of native heart without angina pectoris  Goal:	Management  Assessment and plan of treatment:	Please continue taking your medications as prescribed and follow up with your Cardiologist, Dr Wick, within two weeks of discharge.  If you experience any redness, discharge, fever, or signs of infection at your right wrist or right groin, please call your Cardiologist immediately or come back to the [place] ED.  Please also return to the ED if you experience any chest pain or shortness of breath.

## 2018-05-13 NOTE — PROGRESS NOTE ADULT - ATTENDING COMMENTS
s/p LHC with RAMON, on DAPT. Pt hemodynamically stable to be discharged home with close follow up with Dr. Noel as outpatient    51 minutes spent on discharge process    Uma Anderson MD  Division of Hospital Medicine  Pager: [phone]  Office: [phone]

## 2018-05-13 NOTE — PROGRESS NOTE ADULT - PROBLEM SELECTOR PLAN 1
- Pt had an abnormal stress test as outpatient  - Cardiac cath showed 90% stenosis of the left circumflex distal artery, s/p RAMON placement   - Pt completed ASA and plavix load   - c/w ASA 81mg and Plavix 75mg q daily   - c/w High intensity statin, Rosuvastatin 40mg once daily   - f/u Cardiology recs  - monitor on telemetry   - f/u EKG.

## 2018-05-13 NOTE — DISCHARGE NOTE ADULT - HOSPITAL COURSE
Hospital Admission  47 y.o. male with HLD, hyperlipoproteinemia A, gout and allergies admitted after an abnormal stress test for an elective catheterization The patient states he was in his usual health until yesterday morning the patient on his morning jog began to have mild substernal pressure like chest pain. The patient contacted his cardiologist and was schedule for a exercise stress test. On the stress test the patient developed ST depressions in V3-V6 after 6 minutes of exercise. The patient was recommended to have an elective cardiac cath by Dr. Noel, which the patient agreed to. The patient underwent cardiac cath on 5/12 and had RAMON placed in the Left circumflex artery (90% stenosis). The patient at this time has no complaints. Currently denies fever, chills, night sweats, cough, nasal congestion, dyspnea, chest pain, palpations, nausea/vomiting, abd. pain, diarrhea, constipation, dysuria, urinary frequency, leg swelling, joint pain, and rashes.  The patient denies any pain at the femoral access site.     Hospital Course  Patient underwent LHC with RAMON to the LCx (90%) via the RFA after failed RRA.  Patient was monitored on Telemetry overnight without complications and was discharged home the next day.

## 2018-05-15 PROBLEM — E78.89 OTHER LIPOPROTEIN METABOLISM DISORDERS: Chronic | Status: ACTIVE | Noted: 2018-05-12

## 2018-05-15 PROBLEM — E78.5 HYPERLIPIDEMIA, UNSPECIFIED: Chronic | Status: ACTIVE | Noted: 2018-05-12

## 2018-05-15 PROBLEM — I25.10 ATHEROSCLEROTIC HEART DISEASE OF NATIVE CORONARY ARTERY WITHOUT ANGINA PECTORIS: Chronic | Status: ACTIVE | Noted: 2018-05-12

## 2018-05-18 ENCOUNTER — APPOINTMENT (OUTPATIENT)
Dept: CV DIAGNOSTICS | Facility: HOSPITAL | Age: 48
End: 2018-05-18

## 2018-05-22 ENCOUNTER — NON-APPOINTMENT (OUTPATIENT)
Age: 48
End: 2018-05-22

## 2018-05-22 ENCOUNTER — APPOINTMENT (OUTPATIENT)
Dept: CARDIOLOGY | Facility: CLINIC | Age: 48
End: 2018-05-22
Payer: COMMERCIAL

## 2018-05-22 VITALS — HEART RATE: 64 BPM | SYSTOLIC BLOOD PRESSURE: 118 MMHG | DIASTOLIC BLOOD PRESSURE: 79 MMHG | OXYGEN SATURATION: 97 %

## 2018-05-22 PROCEDURE — 99215 OFFICE O/P EST HI 40 MIN: CPT

## 2018-05-22 PROCEDURE — 93000 ELECTROCARDIOGRAM COMPLETE: CPT

## 2018-08-29 ENCOUNTER — NON-APPOINTMENT (OUTPATIENT)
Age: 48
End: 2018-08-29

## 2018-08-29 ENCOUNTER — APPOINTMENT (OUTPATIENT)
Dept: CARDIOLOGY | Facility: CLINIC | Age: 48
End: 2018-08-29
Payer: COMMERCIAL

## 2018-08-29 VITALS — DIASTOLIC BLOOD PRESSURE: 77 MMHG | OXYGEN SATURATION: 100 % | HEART RATE: 67 BPM | SYSTOLIC BLOOD PRESSURE: 120 MMHG

## 2018-08-29 PROCEDURE — 99215 OFFICE O/P EST HI 40 MIN: CPT

## 2018-08-29 PROCEDURE — 93000 ELECTROCARDIOGRAM COMPLETE: CPT

## 2018-09-07 ENCOUNTER — APPOINTMENT (OUTPATIENT)
Dept: CV DIAGNOSTICS | Facility: HOSPITAL | Age: 48
End: 2018-09-07

## 2018-09-07 ENCOUNTER — OUTPATIENT (OUTPATIENT)
Dept: OUTPATIENT SERVICES | Facility: HOSPITAL | Age: 48
LOS: 1 days | End: 2018-09-07
Payer: COMMERCIAL

## 2018-09-07 DIAGNOSIS — I25.10 ATHEROSCLEROTIC HEART DISEASE OF NATIVE CORONARY ARTERY WITHOUT ANGINA PECTORIS: ICD-10-CM

## 2018-09-07 PROCEDURE — 93018 CV STRESS TEST I&R ONLY: CPT

## 2018-09-07 PROCEDURE — 78452 HT MUSCLE IMAGE SPECT MULT: CPT

## 2018-09-07 PROCEDURE — 93016 CV STRESS TEST SUPVJ ONLY: CPT

## 2018-09-07 PROCEDURE — 78452 HT MUSCLE IMAGE SPECT MULT: CPT | Mod: 26

## 2018-09-07 PROCEDURE — 93017 CV STRESS TEST TRACING ONLY: CPT

## 2018-09-07 PROCEDURE — A9500: CPT

## 2018-09-10 ENCOUNTER — TRANSCRIPTION ENCOUNTER (OUTPATIENT)
Age: 48
End: 2018-09-10

## 2018-11-21 ENCOUNTER — APPOINTMENT (OUTPATIENT)
Dept: CARDIOLOGY | Facility: CLINIC | Age: 48
End: 2018-11-21

## 2018-12-21 ENCOUNTER — MEDICATION RENEWAL (OUTPATIENT)
Age: 48
End: 2018-12-21

## 2019-02-27 ENCOUNTER — APPOINTMENT (OUTPATIENT)
Dept: CARDIOLOGY | Facility: CLINIC | Age: 49
End: 2019-02-27
Payer: COMMERCIAL

## 2019-02-27 ENCOUNTER — NON-APPOINTMENT (OUTPATIENT)
Age: 49
End: 2019-02-27

## 2019-02-27 VITALS
OXYGEN SATURATION: 100 % | DIASTOLIC BLOOD PRESSURE: 79 MMHG | WEIGHT: 158 LBS | BODY MASS INDEX: 24.8 KG/M2 | HEART RATE: 65 BPM | SYSTOLIC BLOOD PRESSURE: 120 MMHG | HEIGHT: 67 IN

## 2019-02-27 PROCEDURE — 93000 ELECTROCARDIOGRAM COMPLETE: CPT

## 2019-02-27 PROCEDURE — 99214 OFFICE O/P EST MOD 30 MIN: CPT

## 2019-02-27 PROCEDURE — 99401 PREV MED CNSL INDIV APPRX 15: CPT

## 2020-04-13 ENCOUNTER — TRANSCRIPTION ENCOUNTER (OUTPATIENT)
Age: 50
End: 2020-04-13

## 2020-04-17 ENCOUNTER — APPOINTMENT (OUTPATIENT)
Dept: CARDIOLOGY | Facility: CLINIC | Age: 50
End: 2020-04-17
Payer: COMMERCIAL

## 2020-04-17 DIAGNOSIS — R07.89 OTHER CHEST PAIN: ICD-10-CM

## 2020-04-17 PROCEDURE — 99214 OFFICE O/P EST MOD 30 MIN: CPT | Mod: 95

## 2021-02-25 ENCOUNTER — TRANSCRIPTION ENCOUNTER (OUTPATIENT)
Age: 51
End: 2021-02-25

## 2022-03-17 ENCOUNTER — NON-APPOINTMENT (OUTPATIENT)
Age: 52
End: 2022-03-17

## 2022-03-28 ENCOUNTER — NON-APPOINTMENT (OUTPATIENT)
Age: 52
End: 2022-03-28

## 2022-03-28 ENCOUNTER — APPOINTMENT (OUTPATIENT)
Dept: CARDIOLOGY | Facility: CLINIC | Age: 52
End: 2022-03-28
Payer: COMMERCIAL

## 2022-03-28 VITALS
DIASTOLIC BLOOD PRESSURE: 80 MMHG | HEART RATE: 68 BPM | WEIGHT: 170 LBS | HEIGHT: 67 IN | BODY MASS INDEX: 26.68 KG/M2 | SYSTOLIC BLOOD PRESSURE: 128 MMHG | OXYGEN SATURATION: 99 %

## 2022-03-28 DIAGNOSIS — E78.41 ELEVATED LIPOPROTEIN(A): ICD-10-CM

## 2022-03-28 PROCEDURE — 99402 PREV MED CNSL INDIV APPRX 30: CPT

## 2022-03-28 PROCEDURE — 93000 ELECTROCARDIOGRAM COMPLETE: CPT

## 2022-03-28 PROCEDURE — 99215 OFFICE O/P EST HI 40 MIN: CPT | Mod: 25

## 2022-03-28 RX ORDER — ALLOPURINOL 100 MG/1
100 TABLET ORAL
Refills: 0 | Status: ACTIVE | COMMUNITY

## 2022-03-28 RX ORDER — CLOPIDOGREL BISULFATE 75 MG/1
75 TABLET, FILM COATED ORAL
Qty: 90 | Refills: 3 | Status: DISCONTINUED | COMMUNITY
Start: 2018-12-21 | End: 2022-03-28

## 2022-03-28 RX ORDER — ASPIRIN ENTERIC COATED TABLETS 81 MG 81 MG/1
81 TABLET, DELAYED RELEASE ORAL
Refills: 0 | Status: ACTIVE | COMMUNITY
Start: 2022-03-28

## 2022-03-29 LAB
ALBUMIN SERPL ELPH-MCNC: 4.7 G/DL
ALP BLD-CCNC: 69 U/L
ALT SERPL-CCNC: 27 U/L
ANION GAP SERPL CALC-SCNC: 13 MMOL/L
APO LP(A) SERPL-MCNC: 344.4 NMOL/L
AST SERPL-CCNC: 26 U/L
BILIRUB SERPL-MCNC: 0.5 MG/DL
BUN SERPL-MCNC: 22 MG/DL
CALCIUM SERPL-MCNC: 9.7 MG/DL
CHLORIDE SERPL-SCNC: 103 MMOL/L
CHOLEST SERPL-MCNC: 112 MG/DL
CO2 SERPL-SCNC: 26 MMOL/L
CREAT SERPL-MCNC: 0.98 MG/DL
EGFR: 92 ML/MIN/1.73M2
GLUCOSE SERPL-MCNC: 87 MG/DL
HDLC SERPL-MCNC: 35 MG/DL
LDLC SERPL CALC-MCNC: 45 MG/DL
NONHDLC SERPL-MCNC: 77 MG/DL
POTASSIUM SERPL-SCNC: 4.6 MMOL/L
PROT SERPL-MCNC: 7.2 G/DL
SODIUM SERPL-SCNC: 141 MMOL/L
TRIGL SERPL-MCNC: 158 MG/DL
TSH SERPL-ACNC: 2.84 UIU/ML

## 2022-03-30 LAB
BASOPHILS # BLD AUTO: 0.04 K/UL
BASOPHILS NFR BLD AUTO: 0.6 %
EOSINOPHIL # BLD AUTO: 0.11 K/UL
EOSINOPHIL NFR BLD AUTO: 1.7 %
ESTIMATED AVERAGE GLUCOSE: 120 MG/DL
HBA1C MFR BLD HPLC: 5.8 %
HCT VFR BLD CALC: 48.6 %
HGB BLD-MCNC: 15.9 G/DL
IMM GRANULOCYTES NFR BLD AUTO: 0.3 %
LYMPHOCYTES # BLD AUTO: 1.25 K/UL
LYMPHOCYTES NFR BLD AUTO: 19.5 %
MAN DIFF?: NORMAL
MCHC RBC-ENTMCNC: 31.5 PG
MCHC RBC-ENTMCNC: 32.7 GM/DL
MCV RBC AUTO: 96.2 FL
MONOCYTES # BLD AUTO: 1.06 K/UL
MONOCYTES NFR BLD AUTO: 16.5 %
NEUTROPHILS # BLD AUTO: 3.94 K/UL
NEUTROPHILS NFR BLD AUTO: 61.4 %
PLATELET # BLD AUTO: 273 K/UL
RBC # BLD: 5.05 M/UL
RBC # FLD: 13.1 %
WBC # FLD AUTO: 6.42 K/UL

## 2022-12-09 ENCOUNTER — TRANSCRIPTION ENCOUNTER (OUTPATIENT)
Age: 52
End: 2022-12-09

## 2023-01-26 ENCOUNTER — NON-APPOINTMENT (OUTPATIENT)
Age: 53
End: 2023-01-26

## 2023-01-26 ENCOUNTER — APPOINTMENT (OUTPATIENT)
Dept: CARDIOLOGY | Facility: CLINIC | Age: 53
End: 2023-01-26
Payer: COMMERCIAL

## 2023-01-26 VITALS
SYSTOLIC BLOOD PRESSURE: 118 MMHG | OXYGEN SATURATION: 99 % | BODY MASS INDEX: 27 KG/M2 | DIASTOLIC BLOOD PRESSURE: 78 MMHG | HEIGHT: 67 IN | WEIGHT: 172 LBS | RESPIRATION RATE: 64 BRPM

## 2023-01-26 PROCEDURE — 99215 OFFICE O/P EST HI 40 MIN: CPT | Mod: 25

## 2023-01-26 PROCEDURE — 99402 PREV MED CNSL INDIV APPRX 30: CPT

## 2023-01-26 RX ORDER — EZETIMIBE 10 MG/1
10 TABLET ORAL
Qty: 90 | Refills: 3 | Status: ACTIVE | COMMUNITY
Start: 2018-05-13 | End: 1900-01-01

## 2023-01-27 ENCOUNTER — TRANSCRIPTION ENCOUNTER (OUTPATIENT)
Age: 53
End: 2023-01-27

## 2023-02-03 ENCOUNTER — TRANSCRIPTION ENCOUNTER (OUTPATIENT)
Age: 53
End: 2023-02-03

## 2023-03-15 ENCOUNTER — TRANSCRIPTION ENCOUNTER (OUTPATIENT)
Age: 53
End: 2023-03-15

## 2023-04-20 ENCOUNTER — TRANSCRIPTION ENCOUNTER (OUTPATIENT)
Age: 53
End: 2023-04-20

## 2023-04-20 ENCOUNTER — RX RENEWAL (OUTPATIENT)
Age: 53
End: 2023-04-20

## 2023-06-23 ENCOUNTER — TRANSCRIPTION ENCOUNTER (OUTPATIENT)
Age: 53
End: 2023-06-23

## 2023-06-28 ENCOUNTER — TRANSCRIPTION ENCOUNTER (OUTPATIENT)
Age: 53
End: 2023-06-28

## 2023-06-29 ENCOUNTER — TRANSCRIPTION ENCOUNTER (OUTPATIENT)
Age: 53
End: 2023-06-29

## 2023-07-14 ENCOUNTER — TRANSCRIPTION ENCOUNTER (OUTPATIENT)
Age: 53
End: 2023-07-14

## 2023-07-28 ENCOUNTER — APPOINTMENT (OUTPATIENT)
Dept: CARDIOLOGY | Facility: CLINIC | Age: 53
End: 2023-07-28

## 2023-07-31 ENCOUNTER — APPOINTMENT (OUTPATIENT)
Dept: CARDIOLOGY | Facility: CLINIC | Age: 53
End: 2023-07-31
Payer: COMMERCIAL

## 2023-07-31 VITALS
HEIGHT: 67 IN | BODY MASS INDEX: 27.47 KG/M2 | OXYGEN SATURATION: 99 % | HEART RATE: 59 BPM | DIASTOLIC BLOOD PRESSURE: 76 MMHG | SYSTOLIC BLOOD PRESSURE: 118 MMHG | WEIGHT: 175 LBS

## 2023-07-31 PROCEDURE — 99402 PREV MED CNSL INDIV APPRX 30: CPT

## 2023-07-31 PROCEDURE — 99215 OFFICE O/P EST HI 40 MIN: CPT | Mod: 25

## 2023-08-01 LAB
ALBUMIN SERPL ELPH-MCNC: 4.7 G/DL
ALP BLD-CCNC: 68 U/L
ALT SERPL-CCNC: 33 U/L
ANION GAP SERPL CALC-SCNC: 11 MMOL/L
AST SERPL-CCNC: 22 U/L
BILIRUB SERPL-MCNC: 0.5 MG/DL
BUN SERPL-MCNC: 19 MG/DL
CALCIUM SERPL-MCNC: 9.8 MG/DL
CHLORIDE SERPL-SCNC: 104 MMOL/L
CHOLEST SERPL-MCNC: 74 MG/DL
CO2 SERPL-SCNC: 25 MMOL/L
CREAT SERPL-MCNC: 0.98 MG/DL
EGFR: 92 ML/MIN/1.73M2
ESTIMATED AVERAGE GLUCOSE: 117 MG/DL
GLUCOSE SERPL-MCNC: 92 MG/DL
HBA1C MFR BLD HPLC: 5.7 %
HDLC SERPL-MCNC: 37 MG/DL
LDLC SERPL CALC-MCNC: 12 MG/DL
NONHDLC SERPL-MCNC: 37 MG/DL
NT-PROBNP SERPL-MCNC: <36 PG/ML
POTASSIUM SERPL-SCNC: 4.7 MMOL/L
PROT SERPL-MCNC: 7.3 G/DL
SODIUM SERPL-SCNC: 141 MMOL/L
TRIGL SERPL-MCNC: 143 MG/DL
TSH SERPL-ACNC: 4.31 UIU/ML

## 2023-08-02 LAB — APO LP(A) SERPL-MCNC: 180.5 NMOL/L

## 2023-08-14 ENCOUNTER — APPOINTMENT (OUTPATIENT)
Dept: CARDIOLOGY | Facility: CLINIC | Age: 53
End: 2023-08-14
Payer: COMMERCIAL

## 2023-08-14 PROCEDURE — 93306 TTE W/DOPPLER COMPLETE: CPT

## 2024-01-02 ENCOUNTER — TRANSCRIPTION ENCOUNTER (OUTPATIENT)
Age: 54
End: 2024-01-02

## 2024-01-02 RX ORDER — ALIROCUMAB 150 MG/ML
150 INJECTION, SOLUTION SUBCUTANEOUS
Qty: 6 | Refills: 3 | Status: ACTIVE | COMMUNITY
Start: 2023-01-26 | End: 1900-01-01

## 2024-04-29 ENCOUNTER — TRANSCRIPTION ENCOUNTER (OUTPATIENT)
Age: 54
End: 2024-04-29

## 2024-04-30 ENCOUNTER — RX RENEWAL (OUTPATIENT)
Age: 54
End: 2024-04-30

## 2024-05-02 ENCOUNTER — TRANSCRIPTION ENCOUNTER (OUTPATIENT)
Age: 54
End: 2024-05-02

## 2024-05-05 LAB
ALBUMIN SERPL ELPH-MCNC: 4.6 G/DL
ALP BLD-CCNC: 71 U/L
ALT SERPL-CCNC: 32 U/L
ANION GAP SERPL CALC-SCNC: 16 MMOL/L
AST SERPL-CCNC: 28 U/L
BILIRUB SERPL-MCNC: 0.4 MG/DL
BUN SERPL-MCNC: 21 MG/DL
CALCIUM SERPL-MCNC: 9.9 MG/DL
CHLORIDE SERPL-SCNC: 104 MMOL/L
CHOLEST SERPL-MCNC: 78 MG/DL
CO2 SERPL-SCNC: 21 MMOL/L
CREAT SERPL-MCNC: 0.99 MG/DL
EGFR: 90 ML/MIN/1.73M2
GLUCOSE SERPL-MCNC: 86 MG/DL
HCT VFR BLD CALC: 47.2 %
HDLC SERPL-MCNC: 39 MG/DL
HGB BLD-MCNC: 15.2 G/DL
LDLC SERPL CALC-MCNC: 16 MG/DL
MCHC RBC-ENTMCNC: 30.8 PG
MCHC RBC-ENTMCNC: 32.2 GM/DL
MCV RBC AUTO: 95.7 FL
NONHDLC SERPL-MCNC: 39 MG/DL
NT-PROBNP SERPL-MCNC: <36 PG/ML
PLATELET # BLD AUTO: 239 K/UL
POTASSIUM SERPL-SCNC: 4.8 MMOL/L
PROT SERPL-MCNC: 7.3 G/DL
RBC # BLD: 4.93 M/UL
RBC # FLD: 13.2 %
SODIUM SERPL-SCNC: 141 MMOL/L
TRIGL SERPL-MCNC: 125 MG/DL
TSH SERPL-ACNC: 5.12 UIU/ML
WBC # FLD AUTO: 6.45 K/UL

## 2024-05-16 PROBLEM — I25.10 ASCVD (ARTERIOSCLEROTIC CARDIOVASCULAR DISEASE): Status: ACTIVE | Noted: 2018-03-18

## 2024-05-16 PROBLEM — E78.5 HYPERLIPIDEMIA, UNSPECIFIED HYPERLIPIDEMIA TYPE: Status: ACTIVE | Noted: 2018-02-27

## 2024-05-16 PROBLEM — Z95.5 S/P CORONARY ARTERY STENT PLACEMENT: Status: ACTIVE | Noted: 2018-05-22

## 2024-05-20 ENCOUNTER — APPOINTMENT (OUTPATIENT)
Dept: CARDIOLOGY | Facility: CLINIC | Age: 54
End: 2024-05-20
Payer: COMMERCIAL

## 2024-05-20 ENCOUNTER — NON-APPOINTMENT (OUTPATIENT)
Age: 54
End: 2024-05-20

## 2024-05-20 VITALS
HEART RATE: 80 BPM | OXYGEN SATURATION: 97 % | SYSTOLIC BLOOD PRESSURE: 150 MMHG | HEIGHT: 67 IN | RESPIRATION RATE: 18 BRPM | DIASTOLIC BLOOD PRESSURE: 80 MMHG | BODY MASS INDEX: 27.31 KG/M2 | WEIGHT: 174 LBS

## 2024-05-20 DIAGNOSIS — Z95.5 PRESENCE OF CORONARY ANGIOPLASTY IMPLANT AND GRAFT: ICD-10-CM

## 2024-05-20 DIAGNOSIS — E78.5 HYPERLIPIDEMIA, UNSPECIFIED: ICD-10-CM

## 2024-05-20 DIAGNOSIS — I25.10 ATHEROSCLEROTIC HEART DISEASE OF NATIVE CORONARY ARTERY W/OUT ANGINA PECTORIS: ICD-10-CM

## 2024-05-20 PROCEDURE — 93000 ELECTROCARDIOGRAM COMPLETE: CPT

## 2024-05-20 PROCEDURE — 99402 PREV MED CNSL INDIV APPRX 30: CPT

## 2024-05-20 PROCEDURE — G2211 COMPLEX E/M VISIT ADD ON: CPT | Mod: NC,1L

## 2024-05-20 PROCEDURE — 99215 OFFICE O/P EST HI 40 MIN: CPT | Mod: 25

## 2024-05-20 RX ORDER — ROSUVASTATIN CALCIUM 40 MG/1
40 TABLET, FILM COATED ORAL
Qty: 90 | Refills: 3 | Status: ACTIVE | COMMUNITY
Start: 2018-02-27 | End: 1900-01-01

## 2024-11-12 ENCOUNTER — TRANSCRIPTION ENCOUNTER (OUTPATIENT)
Age: 54
End: 2024-11-12

## 2025-08-19 ENCOUNTER — TRANSCRIPTION ENCOUNTER (OUTPATIENT)
Age: 55
End: 2025-08-19

## 2025-08-19 ENCOUNTER — RX RENEWAL (OUTPATIENT)
Age: 55
End: 2025-08-19

## 2025-08-19 DIAGNOSIS — E78.41 ELEVATED LIPOPROTEIN(A): ICD-10-CM
